# Patient Record
Sex: MALE
[De-identification: names, ages, dates, MRNs, and addresses within clinical notes are randomized per-mention and may not be internally consistent; named-entity substitution may affect disease eponyms.]

---

## 2022-11-07 PROBLEM — Z00.00 ENCOUNTER FOR PREVENTIVE HEALTH EXAMINATION: Status: ACTIVE | Noted: 2022-11-07

## 2023-05-18 ENCOUNTER — FORM ENCOUNTER (OUTPATIENT)
Age: 53
End: 2023-05-18

## 2024-03-10 ENCOUNTER — NON-APPOINTMENT (OUTPATIENT)
Age: 54
End: 2024-03-10

## 2024-07-23 ENCOUNTER — APPOINTMENT (OUTPATIENT)
Dept: ORTHOPEDIC SURGERY | Facility: CLINIC | Age: 54
End: 2024-07-23
Payer: OTHER MISCELLANEOUS

## 2024-07-23 VITALS — BODY MASS INDEX: 30.78 KG/M2 | WEIGHT: 215 LBS | HEIGHT: 70 IN

## 2024-07-23 DIAGNOSIS — Z78.9 OTHER SPECIFIED HEALTH STATUS: ICD-10-CM

## 2024-07-23 DIAGNOSIS — S46.011A STRAIN OF MUSCLE(S) AND TENDON(S) OF THE ROTATOR CUFF OF RIGHT SHOULDER, INITIAL ENCOUNTER: ICD-10-CM

## 2024-07-23 DIAGNOSIS — I10 ESSENTIAL (PRIMARY) HYPERTENSION: ICD-10-CM

## 2024-07-23 DIAGNOSIS — C80.1 MALIGNANT (PRIMARY) NEOPLASM, UNSPECIFIED: ICD-10-CM

## 2024-07-23 DIAGNOSIS — M93.20 OSTEOCHONDRITIS DISSECANS OF UNSPECIFIED SITE: ICD-10-CM

## 2024-07-23 DIAGNOSIS — S83.511A SPRAIN OF ANTERIOR CRUCIATE LIGAMENT OF RIGHT KNEE, INITIAL ENCOUNTER: ICD-10-CM

## 2024-07-23 PROCEDURE — 73030 X-RAY EXAM OF SHOULDER: CPT | Mod: RT

## 2024-07-23 PROCEDURE — J3490M: CUSTOM

## 2024-07-23 PROCEDURE — 73564 X-RAY EXAM KNEE 4 OR MORE: CPT | Mod: RT

## 2024-07-23 PROCEDURE — 20611 DRAIN/INJ JOINT/BURSA W/US: CPT | Mod: RT

## 2024-07-23 PROCEDURE — 99204 OFFICE O/P NEW MOD 45 MIN: CPT

## 2024-07-23 RX ORDER — MELOXICAM 15 MG/1
15 TABLET ORAL DAILY
Qty: 30 | Refills: 1 | Status: ACTIVE | COMMUNITY
Start: 2024-07-23 | End: 1900-01-01

## 2024-07-23 RX ORDER — CLONIDINE HYDROCHLORIDE 0.1 MG/1
0.1 TABLET ORAL
Refills: 0 | Status: ACTIVE | COMMUNITY

## 2024-07-28 PROBLEM — M93.20 OSTEOCHONDRITIS DISSECANS: Status: ACTIVE | Noted: 2024-07-28

## 2024-07-28 NOTE — PROCEDURE
[FreeTextEntry3] : Procedure Note: Musculoskeletal Injection Diagnosis: Right shoulder bursitis, rotator cuff pathology  Procedure: Right shoulder subacromial 9/2 Celestone injection under US guidance    Indication:  The patient has had persistent pain despite conservative treatment.  Risks, benefits and alternatives to procedure were discussed; all questions were answered to the patient's apparent satisfaction and informed consent obtained.  The patient denied prior problems with local anesthetics, injectable cortisones, chicken allergy, coagulopathy and no relevant drug or preservative allergies or sensitivities.   The area of injection was prepared in a sterile fashion.  Prior to injection a 'Time Out' was conducted in accordance with [facility] policy and the site and nature of procedure verified with the patient.   Procedure: The procedure was carried out utilizing sterile technique from a superolateral arthroscopic portal position. The needle was placed under ultrasound guidance to improve accuracy and minimize risk to the patient and diagnostic ultrasound in the long and short axis revealed partial rotator cuff tearing    Ultrasound Indication: evaluation of rotator cuff tendon      Injection into the target area with care taken to aspirate frequently to minimize the risk of intravascular injection was performed with: ( ) 1cc of Depomedrol (80mg/ml) (X) 2cc of Betamethasone (Celestone) (10mg/ml) ( ) 1cc of Toradol (30mg/ml) (X) 9cc of 0.5% Bupivacaine ( ) 1cc of 1% Lidocaine   Patient tolerated the procedure well and direct pressure was applied for hemostasis. The patient was reminded of potential post-injection risks including, but not limited to, delayed hypersensitivity reactions and/or infection.  The patient verified that they had the office and the Emergency Room's contact information if any problems should arise.  After several minutes, the patient informed me that they felt fine and was released from the office.

## 2024-07-28 NOTE — HISTORY OF PRESENT ILLNESS
[de-identified] : Patient is a 54 year old male here to evaluate right knee and right shoulder pain, Worker's Comp DOI 6/21/24. Patient is a  for [email or web address]. Patient was carrying two car tires off the truck and stepped on loose concrete tiles which slipped out underneath him, causing him to fall hard. Patient notes sharp pain in the shoulder that radiates into the bicep. Patient notes sharp pain localized in the front of the knee. Patient notes pain in the shoulder is most prevalent when raising the arm above shoulder level, and pain in the knee is most prevalent when walking. Patient uses ice and medication as needed. Patient notes no prior injury to the knee, and notes prior surgery on the right shoulder. Patient is not currently working due to the injury.

## 2024-07-28 NOTE — DISCUSSION/SUMMARY
[Medication Risks Reviewed] : Medication risks reviewed [Surgical risks reviewed] : Surgical risks reviewed [de-identified] : WC DOI: 6/21/24- not currently working   In regard to RIGHT CLAUDIO...  X-Ray right shoulder reveals evidence of minor calcifications about supraspinatus tendon.  Reviewed MRI images (no report) of right shoulder from stand up, partial rotator cuff tearing unable to decipher for full thickness tearing considering poor image qualities, no fat gradients and no T2 cuts.   Advised the patient and his father of the poor diagnostic value of a stand-up MRI study to evaluate for rotator cuff tearing. Considering the patients clinical examination and rotator cuff weakness now interfering with activities of daily living, we are concerned for a rotator cuff tear. The patient requires a better study on Chanda magnet with T2 cuts to evaluate for possible rotator cuff tear and rule out surgical intervention.   We are not ruling out surgery at this time however, the patient requires a better MRI to further guide. Recommended treating with formal physical therapy and injection, will evaluate tearing under US. Dependent on response to non-operative management, we will discuss surgery.   Discussed treatment options in the form of steroid injection therapy to temporarily aid in pain and inflammation. The risks, benefits and contents of the injection have been discussed. Risks include but are not limited to allergic reaction, flare reaction, permanent white skin discoloration at the injection site and infection.  The patient understands the risks and agrees to having the injection.  All questions have been answered.  Patient elected to receive RIGHT SHOULDER SA 9/2 CELESTONE INJECTION under ultrasound guidance. Advised patient to rest and ice the area. Discussed the timing of the injections and the follow up that is needed. Advised the patient to ice the area that was injected and that it may take a few days for the injection to provide relief.   In regard to RIGHT KNEE... X-Ray right knee reveals evidence of well maintained joint space.  MRI right knee ([facility] Diagnostic 7/10/24) reveals evidence of ACL sprain, lateral femoral condyle osteochondral lesion, chondromalacia.   We reviewed the MRI findings and discussed their diagnosis and treatment options at length including the risks and benefits of both surgical and non-surgical options for the patients knee instability and ACL sprain. Discussed risks of potential surgery however, not indicated at this time. There are risks of sequela injuries given knee instability therefore, emphasized the importance of rehab and work hardening.   Start physical therapy for right knee and right shoulder   Prescribed patient Meloxicam 15mg daily and discussed risks of side effects, as well as timing/management of medication.  Side effects can include but are not limited to gastrointestinal ulcers and irritation, kidney failure, and bleeding issues. Use as directed and take with food to manage pain, inflammation, and discomfort.   Follow up 4 weeks, eval response to knee/shoulder PT and response to celestone SA injection.

## 2024-07-28 NOTE — DISCUSSION/SUMMARY
[Medication Risks Reviewed] : Medication risks reviewed [Surgical risks reviewed] : Surgical risks reviewed [de-identified] : WC DOI: 6/21/24- not currently working   In regard to RIGHT CLAUDIO...  X-Ray right shoulder reveals evidence of minor calcifications about supraspinatus tendon.  Reviewed MRI images (no report) of right shoulder from stand up, partial rotator cuff tearing unable to decipher for full thickness tearing considering poor image qualities, no fat gradients and no T2 cuts.   Advised the patient and his father of the poor diagnostic value of a stand-up MRI study to evaluate for rotator cuff tearing. Considering the patients clinical examination and rotator cuff weakness now interfering with activities of daily living, we are concerned for a rotator cuff tear. The patient requires a better study on Chanda magnet with T2 cuts to evaluate for possible rotator cuff tear and rule out surgical intervention.   We are not ruling out surgery at this time however, the patient requires a better MRI to further guide. Recommended treating with formal physical therapy and injection, will evaluate tearing under US. Dependent on response to non-operative management, we will discuss surgery.   Discussed treatment options in the form of steroid injection therapy to temporarily aid in pain and inflammation. The risks, benefits and contents of the injection have been discussed. Risks include but are not limited to allergic reaction, flare reaction, permanent white skin discoloration at the injection site and infection.  The patient understands the risks and agrees to having the injection.  All questions have been answered.  Patient elected to receive RIGHT SHOULDER SA 9/2 CELESTONE INJECTION under ultrasound guidance. Advised patient to rest and ice the area. Discussed the timing of the injections and the follow up that is needed. Advised the patient to ice the area that was injected and that it may take a few days for the injection to provide relief.   In regard to RIGHT KNEE... X-Ray right knee reveals evidence of well maintained joint space.  MRI right knee ([facility] Diagnostic 7/10/24) reveals evidence of ACL sprain, lateral femoral condyle osteochondral lesion, chondromalacia.   We reviewed the MRI findings and discussed their diagnosis and treatment options at length including the risks and benefits of both surgical and non-surgical options for the patients knee instability and ACL sprain. Discussed risks of potential surgery however, not indicated at this time. There are risks of sequela injuries given knee instability therefore, emphasized the importance of rehab and work hardening.   Start physical therapy for right knee and right shoulder   Prescribed patient Meloxicam 15mg daily and discussed risks of side effects, as well as timing/management of medication.  Side effects can include but are not limited to gastrointestinal ulcers and irritation, kidney failure, and bleeding issues. Use as directed and take with food to manage pain, inflammation, and discomfort.   Follow up 4 weeks, eval response to knee/shoulder PT and response to celestone SA injection.

## 2024-07-28 NOTE — PHYSICAL EXAM
[Sitting] : sitting [Mild] : mild [4 ___] : forward flexion 4[unfilled]/5 [4___] : quadriceps 4[unfilled]/5 [Right] : right knee [All Views] : anteroposterior, lateral, skyline, and anteroposterior standing [FreeTextEntry1] : X-Ray right shoulder reveals evidence of minor calcifications about supraspinatus tendon.  [TWNoteComboBox4] : passive forward flexion 160 degrees [TWNoteComboBox9] : passive abduction 160 degrees [] : no extensor lag [de-identified] : atrophy  [de-identified] : Equivocal lachman, equivocal pivot glide, slight varus/valgus jog  [FreeTextEntry9] : X-Ray right knee reveals evidence of well maintained joint space.  [TWNoteComboBox7] : flexion 120 degrees [de-identified] : extension 3 degrees

## 2024-07-28 NOTE — PROCEDURE
[FreeTextEntry3] : Procedure Note: Musculoskeletal Injection Diagnosis: Right shoulder bursitis, rotator cuff pathology  Procedure: Right shoulder subacromial 9/2 Celestone injection under US guidance    Indication:  The patient has had persistent pain despite conservative treatment.  Risks, benefits and alternatives to procedure were discussed; all questions were answered to the patient's apparent satisfaction and informed consent obtained.  The patient denied prior problems with local anesthetics, injectable cortisones, chicken allergy, coagulopathy and no relevant drug or preservative allergies or sensitivities.   The area of injection was prepared in a sterile fashion.  Prior to injection a 'Time Out' was conducted in accordance with [facility] policy and the site and nature of procedure verified with the patient.   Procedure: The procedure was carried out utilizing sterile technique from a superolateral arthroscopic portal position. The needle was placed under ultrasound guidance to improve accuracy and minimize risk to the patient and diagnostic ultrasound in the long and short axis revealed partial rotator cuff tearing    Ultrasound Indication: evaluation of rotator cuff tendon      Injection into the target area with care taken to aspirate frequently to minimize the risk of intravascular injection was performed with: ( ) 1cc of Depomedrol (80mg/ml) (X) 2cc of Betamethasone (Celestone) (10mg/ml) ( ) 1cc of Toradol (30mg/ml) (X) 9cc of 0.5% Bupivacaine ( ) 1cc of 1% Lidocaine   Patient tolerated the procedure well and direct pressure was applied for hemostasis. The patient was reminded of potential post-injection risks including, but not limited to, delayed hypersensitivity reactions and/or infection.  The patient verified that they had the office and the Emergency Room's contact information if any problems should arise.  After several minutes, the patient informed me that they felt fine and was released from the office.

## 2024-07-28 NOTE — HISTORY OF PRESENT ILLNESS
[de-identified] : Patient is a 54 year old male here to evaluate right knee and right shoulder pain, Worker's Comp DOI 6/21/24. Patient is a  for [employer]. Patient was carrying two car tires off the truck and stepped on loose concrete tiles which slipped out underneath him, causing him to fall hard. Patient notes sharp pain in the shoulder that radiates into the bicep. Patient notes sharp pain localized in the front of the knee. Patient notes pain in the shoulder is most prevalent when raising the arm above shoulder level, and pain in the knee is most prevalent when walking. Patient uses ice and medication as needed. Patient notes no prior injury to the knee, and notes prior surgery on the right shoulder. Patient is not currently working due to the injury.

## 2024-07-28 NOTE — HISTORY OF PRESENT ILLNESS
[de-identified] : Patient is a 54 year old male here to evaluate right knee and right shoulder pain, Worker's Comp DOI 6/21/24. Patient is a  for [employer]. Patient was carrying two car tires off the truck and stepped on loose concrete tiles which slipped out underneath him, causing him to fall hard. Patient notes sharp pain in the shoulder that radiates into the bicep. Patient notes sharp pain localized in the front of the knee. Patient notes pain in the shoulder is most prevalent when raising the arm above shoulder level, and pain in the knee is most prevalent when walking. Patient uses ice and medication as needed. Patient notes no prior injury to the knee, and notes prior surgery on the right shoulder. Patient is not currently working due to the injury.

## 2024-07-28 NOTE — PHYSICAL EXAM
[Sitting] : sitting [Mild] : mild [4 ___] : forward flexion 4[unfilled]/5 [4___] : quadriceps 4[unfilled]/5 [Right] : right knee [All Views] : anteroposterior, lateral, skyline, and anteroposterior standing [FreeTextEntry1] : X-Ray right shoulder reveals evidence of minor calcifications about supraspinatus tendon.  [TWNoteComboBox4] : passive forward flexion 160 degrees [TWNoteComboBox9] : passive abduction 160 degrees [] : no extensor lag [de-identified] : atrophy  [de-identified] : Equivocal lachman, equivocal pivot glide, slight varus/valgus jog  [FreeTextEntry9] : X-Ray right knee reveals evidence of well maintained joint space.  [TWNoteComboBox7] : flexion 120 degrees [de-identified] : extension 3 degrees

## 2024-07-28 NOTE — DATA REVIEWED
[Knee] : knee [I reviewed the films/CD and additionally noted] : I reviewed the films/CD and additionally noted [MRI] : MRI [Right] : of the right [Shoulder] : shoulder [Report was reviewed and noted in the chart] : The report was reviewed and noted in the chart [I independently reviewed and interpreted images and report] : I independently reviewed and interpreted images and report [I reviewed the films/CD] : I reviewed the films/CD [FreeTextEntry1] : MRI right knee ([facility] Diagnostic 7/10/24) reveals evidence of ACL sprain, lateral femoral condyle osteochondral lesion, chondromalacia.  [FreeTextEntry2] : Reviewed MRI images (no report) of right shoulder from stand up, partial rotator cuff tearing unable to decipher for full thickness tearing considering poor image qualities, no fat gradients and no T2 cuts.

## 2024-07-28 NOTE — DISCUSSION/SUMMARY
[Medication Risks Reviewed] : Medication risks reviewed [Surgical risks reviewed] : Surgical risks reviewed [de-identified] : WC DOI: 6/21/24- not currently working   In regard to RIGHT CLAUDIO...  X-Ray right shoulder reveals evidence of minor calcifications about supraspinatus tendon.  Reviewed MRI images (no report) of right shoulder from stand up, partial rotator cuff tearing unable to decipher for full thickness tearing considering poor image qualities, no fat gradients and no T2 cuts.   Advised the patient and his father of the poor diagnostic value of a stand-up MRI study to evaluate for rotator cuff tearing. Considering the patients clinical examination and rotator cuff weakness now interfering with activities of daily living, we are concerned for a rotator cuff tear. The patient requires a better study on Chanda magnet with T2 cuts to evaluate for possible rotator cuff tear and rule out surgical intervention.   We are not ruling out surgery at this time however, the patient requires a better MRI to further guide. Recommended treating with formal physical therapy and injection, will evaluate tearing under US. Dependent on response to non-operative management, we will discuss surgery.   Discussed treatment options in the form of steroid injection therapy to temporarily aid in pain and inflammation. The risks, benefits and contents of the injection have been discussed. Risks include but are not limited to allergic reaction, flare reaction, permanent white skin discoloration at the injection site and infection.  The patient understands the risks and agrees to having the injection.  All questions have been answered.  Patient elected to receive RIGHT SHOULDER SA 9/2 CELESTONE INJECTION under ultrasound guidance. Advised patient to rest and ice the area. Discussed the timing of the injections and the follow up that is needed. Advised the patient to ice the area that was injected and that it may take a few days for the injection to provide relief.   In regard to RIGHT KNEE... X-Ray right knee reveals evidence of well maintained joint space.  MRI right knee ([facility] Diagnostic 7/10/24) reveals evidence of ACL sprain, lateral femoral condyle osteochondral lesion, chondromalacia.   We reviewed the MRI findings and discussed their diagnosis and treatment options at length including the risks and benefits of both surgical and non-surgical options for the patients knee instability and ACL sprain. Discussed risks of potential surgery however, not indicated at this time. There are risks of sequela injuries given knee instability therefore, emphasized the importance of rehab and work hardening.   Start physical therapy for right knee and right shoulder   Prescribed patient Meloxicam 15mg daily and discussed risks of side effects, as well as timing/management of medication.  Side effects can include but are not limited to gastrointestinal ulcers and irritation, kidney failure, and bleeding issues. Use as directed and take with food to manage pain, inflammation, and discomfort.   Follow up 4 weeks, eval response to knee/shoulder PT and response to celestone SA injection.

## 2024-07-28 NOTE — PROCEDURE
[FreeTextEntry3] : Procedure Note: Musculoskeletal Injection Diagnosis: Right shoulder bursitis, rotator cuff pathology  Procedure: Right shoulder subacromial 9/2 Celestone injection under US guidance    Indication:  The patient has had persistent pain despite conservative treatment.  Risks, benefits and alternatives to procedure were discussed; all questions were answered to the patient's apparent satisfaction and informed consent obtained.  The patient denied prior problems with local anesthetics, injectable cortisones, chicken allergy, coagulopathy and no relevant drug or preservative allergies or sensitivities.   The area of injection was prepared in a sterile fashion.  Prior to injection a 'Time Out' was conducted in accordance with [facility] policy and the site and nature of procedure verified with the patient.   Procedure: The procedure was carried out utilizing sterile technique from a superolateral arthroscopic portal position. The needle was placed under ultrasound guidance to improve accuracy and minimize risk to the patient and diagnostic ultrasound in the long and short axis revealed partial rotator cuff tearing    Ultrasound Indication: evaluation of rotator cuff tendon      Injection into the target area with care taken to aspirate frequently to minimize the risk of intravascular injection was performed with: ( ) 1cc of Depomedrol (80mg/ml) (X) 2cc of Betamethasone (Celestone) (10mg/ml) ( ) 1cc of Toradol (30mg/ml) (X) 9cc of 0.5% Bupivacaine ( ) 1cc of 1% Lidocaine   Patient tolerated the procedure well and direct pressure was applied for hemostasis. The patient was reminded of potential post-injection risks including, but not limited to, delayed hypersensitivity reactions and/or infection.  The patient verified that they had the office and the Emergency Room's contact information if any problems should arise.  After several minutes, the patient informed me that they felt fine and was released from the office.

## 2024-07-28 NOTE — PHYSICAL EXAM
[Sitting] : sitting [Mild] : mild [4 ___] : forward flexion 4[unfilled]/5 [4___] : quadriceps 4[unfilled]/5 [Right] : right knee [All Views] : anteroposterior, lateral, skyline, and anteroposterior standing [FreeTextEntry1] : X-Ray right shoulder reveals evidence of minor calcifications about supraspinatus tendon.  [TWNoteComboBox4] : passive forward flexion 160 degrees [TWNoteComboBox9] : passive abduction 160 degrees [] : no extensor lag [de-identified] : atrophy  [de-identified] : Equivocal lachman, equivocal pivot glide, slight varus/valgus jog  [FreeTextEntry9] : X-Ray right knee reveals evidence of well maintained joint space.  [TWNoteComboBox7] : flexion 120 degrees [de-identified] : extension 3 degrees

## 2024-07-28 NOTE — WORK
[Sprain/Strain] : sprain/strain [Torn Ligament/Tendon/Muscle] : torn ligament, tendon or muscle [Was the competent medical cause of the injury] : was the competent medical cause of the injury [Are consistent with the injury] : are consistent with the injury [Consistent with my objective findings] : consistent with my objective findings [Total (100%)] : total (100%) [Does not reveal pre-existing condition(s) that may affect treatment/prognosis] : does not reveal pre-existing condition(s) that may affect treatment/prognosis [Cannot return to work because ________] : cannot return to work because [unfilled] [Unknown at this time] : : unknown at this time [Patient] : patient [Rx may affect patient's ability to return to work, make patient drowsy, or other issue] : Rx may affect patient's ability to return to work, make patient drowsy, or other issue. [I provided the services listed above] :  I provided the services listed above. [FreeTextEntry1] : fair

## 2024-08-20 ENCOUNTER — APPOINTMENT (OUTPATIENT)
Dept: ORTHOPEDIC SURGERY | Facility: CLINIC | Age: 54
End: 2024-08-20
Payer: OTHER MISCELLANEOUS

## 2024-08-20 VITALS — BODY MASS INDEX: 30.78 KG/M2 | WEIGHT: 215 LBS | HEIGHT: 70 IN

## 2024-08-20 DIAGNOSIS — M93.20 OSTEOCHONDRITIS DISSECANS OF UNSPECIFIED SITE: ICD-10-CM

## 2024-08-20 DIAGNOSIS — S83.511A SPRAIN OF ANTERIOR CRUCIATE LIGAMENT OF RIGHT KNEE, INITIAL ENCOUNTER: ICD-10-CM

## 2024-08-20 DIAGNOSIS — S46.011A STRAIN OF MUSCLE(S) AND TENDON(S) OF THE ROTATOR CUFF OF RIGHT SHOULDER, INITIAL ENCOUNTER: ICD-10-CM

## 2024-08-20 PROCEDURE — 99214 OFFICE O/P EST MOD 30 MIN: CPT

## 2024-08-20 RX ORDER — OXYCODONE HYDROCHLORIDE AND ACETAMINOPHEN 10; 325 MG/1; MG/1
TABLET ORAL
Refills: 0 | Status: ACTIVE | COMMUNITY

## 2024-08-23 NOTE — PHYSICAL EXAM
[Sitting] : sitting [Mild] : mild [4 ___] : forward flexion 4[unfilled]/5 [FreeTextEntry1] : X-Ray right shoulder reveals evidence of minor calcifications about supraspinatus tendon.  [4___] : quadriceps 4[unfilled]/5 [Right] : right knee [All Views] : anteroposterior, lateral, skyline, and anteroposterior standing [FreeTextEntry9] : X-Ray right knee reveals evidence of well maintained joint space.  [TWNoteComboBox4] : passive forward flexion 160 degrees [TWNoteComboBox9] : passive abduction 160 degrees [] : no extensor lag [de-identified] : atrophy  [de-identified] : Equivocal lachman, equivocal pivot glide, slight varus/valgus jog  [TWNoteComboBox7] : flexion 120 degrees [de-identified] : extension 3 degrees

## 2024-08-23 NOTE — PHYSICAL EXAM
[Sitting] : sitting [Mild] : mild [4 ___] : forward flexion 4[unfilled]/5 [FreeTextEntry1] : X-Ray right shoulder reveals evidence of minor calcifications about supraspinatus tendon.  [4___] : quadriceps 4[unfilled]/5 [Right] : right knee [All Views] : anteroposterior, lateral, skyline, and anteroposterior standing [FreeTextEntry9] : X-Ray right knee reveals evidence of well maintained joint space.  [TWNoteComboBox4] : passive forward flexion 160 degrees [TWNoteComboBox9] : passive abduction 160 degrees [] : no extensor lag [de-identified] : atrophy  [de-identified] : Equivocal lachman, equivocal pivot glide, slight varus/valgus jog  [TWNoteComboBox7] : flexion 120 degrees [de-identified] : extension 3 degrees

## 2024-08-23 NOTE — DISCUSSION/SUMMARY
[Medication Risks Reviewed] : Medication risks reviewed [Surgical risks reviewed] : Surgical risks reviewed [de-identified] : WC DOI: 6/21/24- not currently working   In regard to RIGHT [id number]...  The patient reports 3 weeks of relief from previous subacromial celestone injection on 7/23/24.  Re-reviewed MRI images and report from stand up MRI revealing evidence of partial rotator cuff tearing unable to decipher for full thickness tearing considering poor image qualities, no fat gradients and no T2 cuts. will continue with rehab but encouraged to try mri with sedation or better magnet as standup low quality for the shoulder   Again, we addressed concern for a probable full thickness rotator cuff tearing however unable to decipher given poor diagnostic value of a stand-up MRI study to evaluate for rotator cuff tearing. Considering the patients clinical examination and rotator cuff weakness now interfering with activities of daily living, we are concerned for a full thickness rotator cuff tear. His previous MRI was not of sufficient quality to ensure a proper treatment plan. The patient requires a better study on Chanda magnet with T2 cuts to evaluate for possible rotator cuff tear and rule out surgical intervention.   Recommended the patient obtain a new MRI with sedation or in a helmet.   We are not ruling out surgery at this time however, the patient requires a better MRI to further guide.    In regard to RIGHT KNEE... We discussed treatment options at length including the risks and benefits of both surgical and non-surgical options for the patients knee instability and ACL sprain. The patient reports gradual, interval improvement in mechanical symptoms since attending formal physical therapy. Discussed risks of potential surgery however, not indicated at this time. There are risks of sequela injuries given knee instability therefore, emphasized the importance of rehab.   Continue PRN use of Mobic. Discussed risks of side effects and timing and management of medication.  Side effects can include but are not limited to gi ulcers and irritation, as well as kidney failure and bleeding issues. Use as directed and take with food.   Follow up s/p MRI

## 2024-08-23 NOTE — DISCUSSION/SUMMARY
[Medication Risks Reviewed] : Medication risks reviewed [Surgical risks reviewed] : Surgical risks reviewed [de-identified] : WC DOI: 6/21/24- not currently working   In regard to RIGHT [id number]...  The patient reports 3 weeks of relief from previous subacromial celestone injection on 7/23/24.  Re-reviewed MRI images and report from stand up MRI revealing evidence of partial rotator cuff tearing unable to decipher for full thickness tearing considering poor image qualities, no fat gradients and no T2 cuts. will continue with rehab but encouraged to try mri with sedation or better magnet as standup low quality for the shoulder   Again, we addressed concern for a probable full thickness rotator cuff tearing however unable to decipher given poor diagnostic value of a stand-up MRI study to evaluate for rotator cuff tearing. Considering the patients clinical examination and rotator cuff weakness now interfering with activities of daily living, we are concerned for a full thickness rotator cuff tear. His previous MRI was not of sufficient quality to ensure a proper treatment plan. The patient requires a better study on Chanda magnet with T2 cuts to evaluate for possible rotator cuff tear and rule out surgical intervention.   Recommended the patient obtain a new MRI with sedation or in a helmet.   We are not ruling out surgery at this time however, the patient requires a better MRI to further guide.    In regard to RIGHT KNEE... We discussed treatment options at length including the risks and benefits of both surgical and non-surgical options for the patients knee instability and ACL sprain. The patient reports gradual, interval improvement in mechanical symptoms since attending formal physical therapy. Discussed risks of potential surgery however, not indicated at this time. There are risks of sequela injuries given knee instability therefore, emphasized the importance of rehab.   Continue PRN use of Mobic. Discussed risks of side effects and timing and management of medication.  Side effects can include but are not limited to gi ulcers and irritation, as well as kidney failure and bleeding issues. Use as directed and take with food.   Follow up s/p MRI

## 2024-08-23 NOTE — HISTORY OF PRESENT ILLNESS
[de-identified] : Patient is here to follow up on right knee/right shoulder. Celestone injection for shoulder (7/23/24) gave relief for 3 weeks. Continued pain with lifting/extending. No improvement with knee. Experiences pain with lateral movements. Attending PT with [facility]. Takes Meloxicam as needed. Currently not working; [employer].  DOI: 6/21/24. : Bc León.

## 2024-08-23 NOTE — HISTORY OF PRESENT ILLNESS
[de-identified] : Patient is here to follow up on right knee/right shoulder. Celestone injection for shoulder (7/23/24) gave relief for 3 weeks. Continued pain with lifting/extending. No improvement with knee. Experiences pain with lateral movements. Attending PT with [facility]. Takes Meloxicam as needed. Currently not working; [employer]  DOI: 6/21/24. : Bc León.

## 2024-09-24 ENCOUNTER — APPOINTMENT (OUTPATIENT)
Dept: ORTHOPEDIC SURGERY | Facility: CLINIC | Age: 54
End: 2024-09-24

## 2024-09-24 DIAGNOSIS — S43.001A UNSPECIFIED SUBLUXATION OF RIGHT SHOULDER JOINT, INITIAL ENCOUNTER: ICD-10-CM

## 2024-09-24 DIAGNOSIS — S46.011A STRAIN OF MUSCLE(S) AND TENDON(S) OF THE ROTATOR CUFF OF RIGHT SHOULDER, INITIAL ENCOUNTER: ICD-10-CM

## 2024-09-24 PROCEDURE — 99215 OFFICE O/P EST HI 40 MIN: CPT

## 2024-10-05 NOTE — HISTORY OF PRESENT ILLNESS
[de-identified] : Patient is here for a worker's comp follow up appointment for the right shoulder, DOI 6/21/24. Patient states pain has worsened since the previous visit. Patient notes pain is most prevalent when raising weight on the arm above shoulder level. Patient notes minimal strength in the shoulder. Patient is currently attending physical therapy at [facility], and also performs home exercises. Patient is not currently working.

## 2024-10-05 NOTE — PHYSICAL EXAM
[Right] : right shoulder [Sitting] : sitting [Mild] : mild [4 ___] : forward flexion 4[unfilled]/5 [4___] : internal rotation 4[unfilled]/5 [] : no erythema [de-identified] : +impingement reinforcement, +empty can signs  [TWNoteComboBox4] : passive forward flexion 160 degrees [TWNoteComboBox9] : passive abduction 160 degrees

## 2024-10-05 NOTE — DISCUSSION/SUMMARY
[Medication Risks Reviewed] : Medication risks reviewed [Surgical risks reviewed] : Surgical risks reviewed [de-identified] : WC DOI: 6/21/24- not currently working   In regard to RIGHT CLAUDIO...  MRI right shoulder reveals evidence of subscapularis tendon tearing with biceps subluxation.  We reviewed the mri findings. We discussed treatment options, both operative and non operative. I do think he is a candidate for surgery. Pain relief is a goal as well as improving function and motion.  Reviewed treatment options for the patient's rotator cuff tendon tear and biceps instability, both of which can be addressed surgically, opposed to his unrelated shoulder instability (2x previous surgeries). It is recommended that he prioritize treatment for the rotator cuff and biceps instability, as these issues appear to be the primary sources of his pain based on clinical examination. The patient has chronic shoulder instability and a history of two previous surgeries. Given his surgical history and bony damage, he is unlikely to benefit from additional surgery aimed at treating the instability. Therefore, he should focus on addressing the rotator cuff tear and biceps instability, for which he is a suitable surgical candidate.   Interscalene anesthesia, general anesthesia and post operative pain management were discussed. The importance of physical therapy postoperative, the gradual recovery and and the rehabilitation program with initial driving restrictions were noted. The use of a sling for for functional recovery was reviewed. Patient understands there are no guarantees. The benefits of decreased pain, increased function and restoring anatomy were outlined. The risks were reviewed including, but not limited to, infection, failure, bleeding, stiffness, pain, clotting, fracture, retear, hardware failure, deformity, functional limitation, scarring, neurovascular compromise, and narcotic use issues. Under certain circumstances we discussed, further surgery may be indicated.   Patient understands 100% recovery is not expected, and the desired level of function may not be achievable. The complicated nature of the patient's condition, including the tear pattern, was noted. We discussed the potential for a prolonged recovery course and the potential for this to affect the patients activities, which could include work regimen. Patients' questions were answered. Other options can be pursued, as we discussed.  Patient does wish to proceed with surgery. This could include shoulder arthroscopic rotator cuff repair vs reconstruction, debridement, synovectomy, subacromial decompression, posterior capsuleyosis of adhesions, possible biceps tenodesis vs tenotomy, distal clavicle resection, and any indicated procedures. We will schedule this at the earliest mutually convenient time. St vs LT issues noted. NSAID uses outlined. Patient will continue HEP, including sleeper stretch.  In regard to RIGHT KNEE... Also treating for right knee patellofemoral instability and ACL sprain under this case.  His shoulder condition is worse than knee at this time therefore he will focus on treating his shoulder. Not ruling out indefinite treatment of his knee at this time.    Continue PRN use of Mobic 15mg. Discussed risks of side effects and timing and management of medication.  Side effects can include but are not limited to gi ulcers and irritation, as well as kidney failure and bleeding issues. Use as directed and take with food.   Follow up accordingly post-op

## 2024-10-05 NOTE — DISCUSSION/SUMMARY
[Medication Risks Reviewed] : Medication risks reviewed [Surgical risks reviewed] : Surgical risks reviewed [de-identified] : WC DOI: 6/21/24- not currently working   In regard to RIGHT CLAUDIO...  MRI right shoulder reveals evidence of subscapularis tendon tearing with biceps subluxation.  We reviewed the mri findings. We discussed treatment options, both operative and non operative. I do think he is a candidate for surgery. Pain relief is a goal as well as improving function and motion.  Reviewed treatment options for the patient's rotator cuff tendon tear and biceps instability, both of which can be addressed surgically, opposed to his unrelated shoulder instability (2x previous surgeries). It is recommended that he prioritize treatment for the rotator cuff and biceps instability, as these issues appear to be the primary sources of his pain based on clinical examination. The patient has chronic shoulder instability and a history of two previous surgeries. Given his surgical history and bony damage, he is unlikely to benefit from additional surgery aimed at treating the instability. Therefore, he should focus on addressing the rotator cuff tear and biceps instability, for which he is a suitable surgical candidate.   Interscalene anesthesia, general anesthesia and post operative pain management were discussed. The importance of physical therapy postoperative, the gradual recovery and and the rehabilitation program with initial driving restrictions were noted. The use of a sling for for functional recovery was reviewed. Patient understands there are no guarantees. The benefits of decreased pain, increased function and restoring anatomy were outlined. The risks were reviewed including, but not limited to, infection, failure, bleeding, stiffness, pain, clotting, fracture, retear, hardware failure, deformity, functional limitation, scarring, neurovascular compromise, and narcotic use issues. Under certain circumstances we discussed, further surgery may be indicated.   Patient understands 100% recovery is not expected, and the desired level of function may not be achievable. The complicated nature of the patient's condition, including the tear pattern, was noted. We discussed the potential for a prolonged recovery course and the potential for this to affect the patients activities, which could include work regimen. Patients' questions were answered. Other options can be pursued, as we discussed.  Patient does wish to proceed with surgery. This could include shoulder arthroscopic rotator cuff repair vs reconstruction, debridement, synovectomy, subacromial decompression, posterior capsuleyosis of adhesions, possible biceps tenodesis vs tenotomy, distal clavicle resection, and any indicated procedures. We will schedule this at the earliest mutually convenient time. St vs LT issues noted. NSAID uses outlined. Patient will continue HEP, including sleeper stretch.  In regard to RIGHT KNEE... Also treating for right knee patellofemoral instability and ACL sprain under this case.  His shoulder condition is worse than knee at this time therefore he will focus on treating his shoulder. Not ruling out indefinite treatment of his knee at this time.    Continue PRN use of Mobic 15mg. Discussed risks of side effects and timing and management of medication.  Side effects can include but are not limited to gi ulcers and irritation, as well as kidney failure and bleeding issues. Use as directed and take with food.   Follow up accordingly post-op

## 2024-10-05 NOTE — DATA REVIEWED
[MRI] : MRI [Right] : of the right [Shoulder] : shoulder [Report was reviewed and noted in the chart] : The report was reviewed and noted in the chart [I independently reviewed and interpreted images and report] : I independently reviewed and interpreted images and report [I reviewed the films/CD] : I reviewed the films/CD [FreeTextEntry1] : MRI right shoulder reveals evidence of subscapularis tendon tearing with biceps subluxation.

## 2024-10-05 NOTE — PHYSICAL EXAM
[Right] : right shoulder [Sitting] : sitting [Mild] : mild [4 ___] : forward flexion 4[unfilled]/5 [4___] : internal rotation 4[unfilled]/5 [] : no erythema [de-identified] : +impingement reinforcement, +empty can signs  [TWNoteComboBox4] : passive forward flexion 160 degrees [TWNoteComboBox9] : passive abduction 160 degrees

## 2024-10-05 NOTE — HISTORY OF PRESENT ILLNESS
[de-identified] : Patient is here for a worker's comp follow up appointment for the right shoulder, DOI 6/21/24. Patient states pain has worsened since the previous visit. Patient notes pain is most prevalent when raising weight on the arm above shoulder level. Patient notes minimal strength in the shoulder. Patient is currently attending physical therapy at [facility], and also performs home exercises. Patient is not currently working.

## 2024-11-19 ENCOUNTER — APPOINTMENT (OUTPATIENT)
Dept: ORTHOPEDIC SURGERY | Facility: CLINIC | Age: 54
End: 2024-11-19

## 2024-12-02 ENCOUNTER — APPOINTMENT (OUTPATIENT)
Dept: ORTHOPEDIC SURGERY | Facility: CLINIC | Age: 54
End: 2024-12-02

## 2025-04-10 ENCOUNTER — APPOINTMENT (OUTPATIENT)
Age: 55
End: 2025-04-10

## 2025-04-15 ENCOUNTER — APPOINTMENT (OUTPATIENT)
Dept: ORTHOPEDIC SURGERY | Facility: CLINIC | Age: 55
End: 2025-04-15

## 2025-04-28 ENCOUNTER — APPOINTMENT (OUTPATIENT)
Dept: MRI IMAGING | Facility: CLINIC | Age: 55
End: 2025-04-28

## 2025-04-28 ENCOUNTER — APPOINTMENT (OUTPATIENT)
Dept: ORTHOPEDIC SURGERY | Facility: CLINIC | Age: 55
End: 2025-04-28
Payer: OTHER MISCELLANEOUS

## 2025-04-28 VITALS — HEIGHT: 70 IN | BODY MASS INDEX: 39.65 KG/M2 | WEIGHT: 277 LBS

## 2025-04-28 DIAGNOSIS — S82.891A OTHER FRACTURE OF RIGHT LOWER LEG, INITIAL ENCOUNTER FOR CLOSED FRACTURE: ICD-10-CM

## 2025-04-28 DIAGNOSIS — S43.001A UNSPECIFIED SUBLUXATION OF RIGHT SHOULDER JOINT, INITIAL ENCOUNTER: ICD-10-CM

## 2025-04-28 DIAGNOSIS — Z00.00 ENCOUNTER FOR GENERAL ADULT MEDICAL EXAMINATION W/OUT ABNORMAL FINDINGS: ICD-10-CM

## 2025-04-28 DIAGNOSIS — S83.511A SPRAIN OF ANTERIOR CRUCIATE LIGAMENT OF RIGHT KNEE, INITIAL ENCOUNTER: ICD-10-CM

## 2025-04-28 DIAGNOSIS — S46.011A STRAIN OF MUSCLE(S) AND TENDON(S) OF THE ROTATOR CUFF OF RIGHT SHOULDER, INITIAL ENCOUNTER: ICD-10-CM

## 2025-04-28 DIAGNOSIS — M93.20 OSTEOCHONDRITIS DISSECANS OF UNSPECIFIED SITE: ICD-10-CM

## 2025-04-28 PROCEDURE — 73610 X-RAY EXAM OF ANKLE: CPT | Mod: RT

## 2025-04-28 PROCEDURE — 99215 OFFICE O/P EST HI 40 MIN: CPT

## 2025-05-02 ENCOUNTER — APPOINTMENT (OUTPATIENT)
Dept: ORTHOPEDIC SURGERY | Facility: CLINIC | Age: 55
End: 2025-05-02
Payer: OTHER MISCELLANEOUS

## 2025-05-02 DIAGNOSIS — S82.841A DISPLACED BIMALLEOLAR FRACTURE OF RIGHT LOWER LEG, INITIAL ENCOUNTER FOR CLOSED FRACTURE: ICD-10-CM

## 2025-05-02 PROCEDURE — 73600 X-RAY EXAM OF ANKLE: CPT | Mod: RT

## 2025-05-02 PROCEDURE — 27808 TREATMENT OF ANKLE FRACTURE: CPT | Mod: RT

## 2025-05-02 PROCEDURE — 99213 OFFICE O/P EST LOW 20 MIN: CPT | Mod: 57

## 2025-05-19 ENCOUNTER — APPOINTMENT (OUTPATIENT)
Dept: ORTHOPEDIC SURGERY | Facility: CLINIC | Age: 55
End: 2025-05-19
Payer: OTHER MISCELLANEOUS

## 2025-05-19 VITALS — BODY MASS INDEX: 37.22 KG/M2 | WEIGHT: 260 LBS | HEIGHT: 70 IN

## 2025-05-19 DIAGNOSIS — Z00.00 ENCOUNTER FOR GENERAL ADULT MEDICAL EXAMINATION W/OUT ABNORMAL FINDINGS: ICD-10-CM

## 2025-05-19 DIAGNOSIS — S82.841D DISPLACED BIMALLEOLAR FRACTURE OF RIGHT LOWER LEG, SUBSEQUENT ENCOUNTER FOR CLOSED FRACTURE WITH ROUTINE HEALING: ICD-10-CM

## 2025-05-19 PROCEDURE — 99024 POSTOP FOLLOW-UP VISIT: CPT

## 2025-05-19 PROCEDURE — 73610 X-RAY EXAM OF ANKLE: CPT | Mod: RT

## 2025-06-09 ENCOUNTER — APPOINTMENT (OUTPATIENT)
Dept: ORTHOPEDIC SURGERY | Facility: CLINIC | Age: 55
End: 2025-06-09
Payer: OTHER MISCELLANEOUS

## 2025-06-09 PROCEDURE — 73610 X-RAY EXAM OF ANKLE: CPT | Mod: RT

## 2025-06-09 PROCEDURE — 99024 POSTOP FOLLOW-UP VISIT: CPT

## 2025-06-09 PROCEDURE — L4361: CPT | Mod: RT

## 2025-06-30 ENCOUNTER — APPOINTMENT (OUTPATIENT)
Dept: ORTHOPEDIC SURGERY | Facility: CLINIC | Age: 55
End: 2025-06-30
Payer: OTHER MISCELLANEOUS

## 2025-06-30 PROCEDURE — 99024 POSTOP FOLLOW-UP VISIT: CPT

## 2025-06-30 PROCEDURE — L1902: CPT | Mod: RT

## 2025-06-30 PROCEDURE — 73610 X-RAY EXAM OF ANKLE: CPT | Mod: RT

## 2025-07-28 ENCOUNTER — APPOINTMENT (OUTPATIENT)
Dept: ORTHOPEDIC SURGERY | Facility: CLINIC | Age: 55
End: 2025-07-28
Payer: OTHER MISCELLANEOUS

## 2025-07-28 VITALS — BODY MASS INDEX: 37.22 KG/M2 | WEIGHT: 260 LBS | HEIGHT: 70 IN

## 2025-07-28 DIAGNOSIS — S82.841D DISPLACED BIMALLEOLAR FRACTURE OF RIGHT LOWER LEG, SUBSEQUENT ENCOUNTER FOR CLOSED FRACTURE WITH ROUTINE HEALING: ICD-10-CM

## 2025-07-28 PROCEDURE — 73610 X-RAY EXAM OF ANKLE: CPT | Mod: RT

## 2025-07-28 PROCEDURE — 99024 POSTOP FOLLOW-UP VISIT: CPT

## 2025-08-11 ENCOUNTER — APPOINTMENT (OUTPATIENT)
Dept: ORTHOPEDIC SURGERY | Facility: CLINIC | Age: 55
End: 2025-08-11

## 2025-08-18 ENCOUNTER — APPOINTMENT (OUTPATIENT)
Age: 55
End: 2025-08-18

## 2025-08-25 ENCOUNTER — APPOINTMENT (OUTPATIENT)
Dept: ORTHOPEDIC SURGERY | Facility: CLINIC | Age: 55
End: 2025-08-25

## 2025-08-25 VITALS — WEIGHT: 260 LBS | BODY MASS INDEX: 37.22 KG/M2 | HEIGHT: 70 IN

## 2025-08-25 DIAGNOSIS — S46.011A STRAIN OF MUSCLE(S) AND TENDON(S) OF THE ROTATOR CUFF OF RIGHT SHOULDER, INITIAL ENCOUNTER: ICD-10-CM

## 2025-08-25 PROCEDURE — 99024 POSTOP FOLLOW-UP VISIT: CPT

## 2025-08-25 RX ORDER — OXYCODONE AND ACETAMINOPHEN 10; 325 MG/1; MG/1
10-325 TABLET ORAL
Qty: 20 | Refills: 0 | Status: ACTIVE | COMMUNITY
Start: 2025-08-18 | End: 1900-01-01

## 2025-09-08 ENCOUNTER — APPOINTMENT (OUTPATIENT)
Dept: ORTHOPEDIC SURGERY | Facility: CLINIC | Age: 55
End: 2025-09-08